# Patient Record
Sex: FEMALE | Race: WHITE | NOT HISPANIC OR LATINO | URBAN - METROPOLITAN AREA
[De-identification: names, ages, dates, MRNs, and addresses within clinical notes are randomized per-mention and may not be internally consistent; named-entity substitution may affect disease eponyms.]

---

## 2020-05-06 ENCOUNTER — APPOINTMENT (OUTPATIENT)
Dept: URBAN - METROPOLITAN AREA SURGERY 18 | Age: 70
Setting detail: DERMATOLOGY
End: 2020-05-07

## 2020-05-06 VITALS — DIASTOLIC BLOOD PRESSURE: 85 MMHG | SYSTOLIC BLOOD PRESSURE: 124 MMHG | HEART RATE: 85 BPM

## 2020-05-06 PROBLEM — C44.321 SQUAMOUS CELL CARCINOMA OF SKIN OF NOSE: Status: ACTIVE | Noted: 2020-05-06

## 2020-05-06 PROCEDURE — OTHER MOHS SURGERY: OTHER

## 2020-05-06 PROCEDURE — OTHER REFERRAL CORRESPONDENCE: OTHER

## 2020-05-06 PROCEDURE — OTHER PRESCRIPTION: OTHER

## 2020-05-06 PROCEDURE — 17311 MOHS 1 STAGE H/N/HF/G: CPT

## 2020-05-06 PROCEDURE — 14061 TIS TRNFR E/N/E/L10.1-30SQCM: CPT

## 2020-05-06 PROCEDURE — 17312 MOHS ADDL STAGE: CPT

## 2020-05-06 PROCEDURE — OTHER COUNSELING: OTHER

## 2020-05-06 PROCEDURE — OTHER ADDITIONAL PATHOLOGY SPECIMEN: OTHER

## 2020-05-06 NOTE — PROCEDURE: MOHS SURGERY
Postop Dx Override (Will Override Above Choices): Poorly Differentiated Squamous Cell Carcinoma with infiltrative features and perineural invasion

## 2020-05-06 NOTE — PROCEDURE: ADDITIONAL PATHOLOGY SPECIMEN
Rational For Additional Specimen: Upstaging of preoperative well differentiated SCC to poorly differentiated SCC with perineural invasion.
Biopsy Type: Frozen Section
Render Path Notes In Note?: No
Detail Level: Detailed
Notification Instructions: Patient will be notified of results of outside pathology evaluation.
Path Notes (To The Dermatopathologist): Preoperatively diganosed well differentiated SCC, diagnostic upstaging to poorly differentiated SCC with perineural invasion during Mohs surgery\\n- perineural invasion seen during first stage of Mohs surgery\\n- please confirm margins\\n\\nSlides from case attached - 2 stages (11 total slides)
Accession #: 
Billing Type: Third-Party Bill
X Size Of Lesion In Cm (Optional): 0

## 2020-05-13 ENCOUNTER — APPOINTMENT (OUTPATIENT)
Dept: URBAN - METROPOLITAN AREA SURGERY 18 | Age: 70
Setting detail: DERMATOLOGY
End: 2020-05-14

## 2020-05-13 DIAGNOSIS — D485 NEOPLASM OF UNCERTAIN BEHAVIOR OF SKIN: ICD-10-CM

## 2020-05-13 DIAGNOSIS — Z48.817 ENCOUNTER FOR SURGICAL AFTERCARE FOLLOWING SURGERY ON THE SKIN AND SUBCUTANEOUS TISSUE: ICD-10-CM

## 2020-05-13 PROBLEM — D48.5 NEOPLASM OF UNCERTAIN BEHAVIOR OF SKIN: Status: ACTIVE | Noted: 2020-05-13

## 2020-05-13 PROCEDURE — 11102 TANGNTL BX SKIN SINGLE LES: CPT | Mod: 79

## 2020-05-13 PROCEDURE — OTHER POST-OP WOUND CHECK: OTHER

## 2020-05-13 PROCEDURE — OTHER BIOPSY BY SHAVE METHOD: OTHER

## 2020-05-13 PROCEDURE — 99024 POSTOP FOLLOW-UP VISIT: CPT

## 2020-05-13 ASSESSMENT — LOCATION ZONE DERM
LOCATION ZONE: NOSE
LOCATION ZONE: NECK

## 2020-05-13 ASSESSMENT — LOCATION DETAILED DESCRIPTION DERM
LOCATION DETAILED: RIGHT INFERIOR ANTERIOR NECK
LOCATION DETAILED: NASAL ROOT

## 2020-05-13 ASSESSMENT — LOCATION SIMPLE DESCRIPTION DERM
LOCATION SIMPLE: NOSE
LOCATION SIMPLE: RIGHT ANTERIOR NECK

## 2020-05-13 NOTE — PROCEDURE: POST-OP WOUND CHECK
Wound Evaluated By: Dr. Camilo Torres
Detail Level: Detailed
Add 97671 Cpt? (Important Note: In 2017 The Use Of 99169 Is Being Tracked By Cms To Determine Future Global Period Reimbursement For Global Periods): yes
Additional Comments: Dressing applied today to remain in place for one week. After second week may get area wet and begin daily dressing changes until well healed. Discussed massage to suture line at 4 weeks. Follow up PRN.

## 2020-05-13 NOTE — PROCEDURE: BIOPSY BY SHAVE METHOD
Size Of Lesion In Cm: 0.6
Detail Level: Detailed
Path Notes (To The Dermatopathologist): \\n
Validate Triangulation: No
Curettage Text: The wound bed was treated with curettage after the biopsy was performed.
Notification Instructions: Patient will be notified of biopsy results. However, patient instructed to call the office if not contacted within 2 weeks.
dressing with hypoallergenic tape
Anesthesia Type: 1% lidocaine with 1:100,000 epinephrine and 408mcg clindamycin/ml and a 1:10 solution of 8.4% sodium bicarbonate
Biopsy Method: 15 blade
Post-Care Instructions: I reviewed with the patient in detail post-care instructions. Patient is to wash the wound with soap and warm water and change the bandage daily; applying vaseline, nonstick bandage cut to fit the biopsy site, and then hypoallergenic tape until fully healed.
Information: Selecting Yes will display possible errors in your note based on the variables you have selected. This validation is only offered as a suggestion for you. PLEASE NOTE THAT THE VALIDATION TEXT WILL BE REMOVED WHEN YOU FINALIZE YOUR NOTE. IF YOU WANT TO FAX A PRELIMINARY NOTE YOU WILL NEED TO TOGGLE THIS TO 'NO' IF YOU DO NOT WANT IT IN YOUR FAXED NOTE.
Anesthesia Volume In Cc (Will Not Render If 0): 1
Hemostasis: Drysol
Depth Of Biopsy: dermis
Billing Type: Third-Party Bill
Silver Nitrate Text: The wound bed was treated with silver nitrate after the biopsy was performed.
Additional Anesthesia Volume In Cc (Will Not Render If 0): 0
Electrodesiccation And Curettage Text: The wound bed was treated with electrodesiccation and curettage after the biopsy was performed.
Biopsy Type: H and E
Electrodesiccation Text: The wound bed was treated with electrodesiccation after the biopsy was performed.
Render Post-Care Instructions In Note?: yes
Type Of Destruction Used: Curettage
Wound Care: Petrolatum
Cryotherapy Text: The wound bed was treated with cryotherapy after the biopsy was performed.
Consent: Written consent was obtained and risks were reviewed including but not limited to scarring, infection, bleeding, scabbing, incomplete removal, nerve damage and allergy to anesthesia.
X Size Of Lesion In Cm: 0.5

## 2020-05-15 ENCOUNTER — APPOINTMENT (OUTPATIENT)
Dept: URBAN - METROPOLITAN AREA SURGERY 18 | Age: 70
Setting detail: DERMATOLOGY
End: 2020-05-15

## 2020-05-15 VITALS — SYSTOLIC BLOOD PRESSURE: 116 MMHG | HEART RATE: 70 BPM | DIASTOLIC BLOOD PRESSURE: 59 MMHG

## 2020-05-15 PROBLEM — C44.42 SQUAMOUS CELL CARCINOMA OF SKIN OF SCALP AND NECK: Status: ACTIVE | Noted: 2020-05-15

## 2020-05-15 PROCEDURE — OTHER COUNSELING: OTHER

## 2020-05-15 PROCEDURE — OTHER MOHS SURGERY: OTHER

## 2020-05-15 PROCEDURE — 17312 MOHS ADDL STAGE: CPT | Mod: 79

## 2020-05-15 PROCEDURE — 17311 MOHS 1 STAGE H/N/HF/G: CPT | Mod: 79

## 2020-05-15 PROCEDURE — 13132 CMPLX RPR F/C/C/M/N/AX/G/H/F: CPT | Mod: 79

## 2020-05-15 NOTE — PROCEDURE: MOHS SURGERY
Received current medication list from care facility for MD to sign.    Mariama, manager verifies list is accurate.  To MD for signature, med list updated.   Spiral Flap Text: The defect edges were debeveled with a #15 scalpel blade.  Given the location of the defect, shape of the defect and the proximity to free margins a spiral flap was deemed most appropriate.  Using a sterile surgical marker, an appropriate rotation flap was drawn incorporating the defect and placing the expected incisions within the relaxed skin tension lines where possible. The area thus outlined was incised deep to adipose tissue with a #15 scalpel blade.  The skin margins were undermined to an appropriate distance in all directions utilizing iris scissors.

## 2022-08-24 ENCOUNTER — APPOINTMENT (OUTPATIENT)
Dept: URBAN - METROPOLITAN AREA SURGERY 18 | Age: 72
Setting detail: DERMATOLOGY
End: 2022-08-25

## 2022-08-24 VITALS — SYSTOLIC BLOOD PRESSURE: 116 MMHG | DIASTOLIC BLOOD PRESSURE: 61 MMHG | HEART RATE: 75 BPM

## 2022-08-24 PROBLEM — D03.62 MELANOMA IN SITU OF LEFT UPPER LIMB, INCLUDING SHOULDER: Status: ACTIVE | Noted: 2022-08-24

## 2022-08-24 PROCEDURE — 11603 EXC TR-EXT MAL+MARG 2.1-3 CM: CPT

## 2022-08-24 PROCEDURE — OTHER COUNSELING: OTHER

## 2022-08-24 PROCEDURE — 13121 CMPLX RPR S/A/L 2.6-7.5 CM: CPT

## 2022-08-24 PROCEDURE — OTHER REFERRAL CORRESPONDENCE: OTHER

## 2022-08-24 PROCEDURE — OTHER EXCISION: OTHER

## 2022-08-24 NOTE — PROCEDURE: EXCISION
[FreeTextEntry1] : D/W caregiver URI with gastroenteritis, likely viral,  encourage hydration- pedialyte; monitor for persistent fever, poor PO intake/dehydration, pt should void at least 3 times daily, call with concerns for recheck.\par RVP nasal PCR obtained today-. Answered patient questions about COVID-19 including signs and symptoms, self home care and proper isolation precautions.\par time spent: 30min\par \par  Complex Repair And Rotation Flap Text: The defect edges were debeveled with a #15 scalpel blade.  The primary defect was closed partially with a complex linear closure.  Given the location of the remaining defect, shape of the defect and the proximity to free margins a rotation flap was deemed most appropriate for complete closure of the defect.  Using a sterile surgical marker, an appropriate advancement flap was drawn incorporating the defect and placing the expected incisions within the relaxed skin tension lines where possible.    The area thus outlined was incised deep to adipose tissue with a #15 scalpel blade.  The skin margins were undermined to an appropriate distance in all directions utilizing iris scissors.

## 2022-08-24 NOTE — PROCEDURE: EXCISION
English Trilobed Flap Text: The defect edges were debeveled with a #15 scalpel blade.  Given the location of the defect and the proximity to free margins a trilobed flap was deemed most appropriate.  Using a sterile surgical marker, an appropriate trilobed flap drawn around the defect.    The area thus outlined was incised deep to adipose tissue with a #15 scalpel blade.  The skin margins were undermined to an appropriate distance in all directions utilizing iris scissors.

## 2024-02-01 NOTE — PROCEDURE: MOHS SURGERY
Thank you so much for allowing me to be your provider today. If relevant, I will have attached educational resources that will help you manage your condition.    We will contact you with your results. You may see them in the [employer] [facility]yla first, please give our office time to read results and contact you with any pertinent treatment plan.     Cheek-To-Nose Interpolation Flap Text: A decision was made to reconstruct the defect utilizing an interpolation axial flap and a staged reconstruction.  A telfa template was made of the defect.  This telfa template was then used to outline the Cheek-To-Nose Interpolation flap.  The donor area for the pedicle flap was then injected with anesthesia.  The flap was excised through the skin and subcutaneous tissue down to the layer of the underlying musculature.  The interpolation flap was carefully excised within this deep plane to maintain its blood supply.  The edges of the donor site were undermined.   The donor site was closed in a primary fashion.  The pedicle was then rotated into position and sutured.  Once the tube was sutured into place, adequate blood supply was confirmed with blanching and refill.  The pedicle was then wrapped with xeroform gauze and dressed appropriately with a telfa and gauze bandage to ensure continued blood supply and protect the attached pedicle.